# Patient Record
Sex: MALE | Race: WHITE | NOT HISPANIC OR LATINO | URBAN - METROPOLITAN AREA
[De-identification: names, ages, dates, MRNs, and addresses within clinical notes are randomized per-mention and may not be internally consistent; named-entity substitution may affect disease eponyms.]

---

## 2018-07-12 ENCOUNTER — OFFICE (OUTPATIENT)
Dept: URBAN - METROPOLITAN AREA CLINIC 67 | Facility: CLINIC | Age: 43
End: 2018-07-12

## 2018-07-12 VITALS — SYSTOLIC BLOOD PRESSURE: 110 MMHG | DIASTOLIC BLOOD PRESSURE: 72 MMHG | WEIGHT: 181 LBS | HEIGHT: 73 IN

## 2018-07-12 DIAGNOSIS — K62.5 RECTAL BLEEDING: ICD-10-CM

## 2018-07-12 DIAGNOSIS — K64.9 HEMORRHOIDS: ICD-10-CM

## 2018-07-12 PROCEDURE — 99244 OFF/OP CNSLTJ NEW/EST MOD 40: CPT

## 2018-07-12 RX ORDER — HYDROCORTISONE ACETATE 25 MG/1
50 SUPPOSITORY RECTAL
Qty: 28 | Status: ACTIVE
Start: 2018-07-12

## 2018-07-12 RX ORDER — HYDROCORTISONE 25 MG/G
CREAM TOPICAL
Qty: 1 | Status: ACTIVE
Start: 2018-07-12

## 2018-07-12 NOTE — SERVICEHPINOTES
This is a 44 yo m with hx of hemorrhoids presents with intermittent rectal bleeding. He reports to have thought it was from hemorrhoids and has tried suppositories with some relief but still with intermittent blood in the stool. He denies any abdominal pain, rectal pain, or changes of bowel habits. He denies any previous colonoscopy. He denies any family hx of colon ca.

## 2018-07-15 ENCOUNTER — HOSPITAL ENCOUNTER (EMERGENCY)
Dept: HOSPITAL 54 - ER | Age: 43
Discharge: HOME | End: 2018-07-15
Payer: COMMERCIAL

## 2018-07-15 VITALS — HEIGHT: 73 IN | BODY MASS INDEX: 23.19 KG/M2 | WEIGHT: 175 LBS

## 2018-07-15 VITALS — DIASTOLIC BLOOD PRESSURE: 66 MMHG | SYSTOLIC BLOOD PRESSURE: 152 MMHG
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
## 2018-07-15 DIAGNOSIS — S63.502A: Primary | ICD-10-CM
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
## 2018-07-15 PROCEDURE — Z7610: HCPCS

## 2018-07-15 PROCEDURE — A4606 OXYGEN PROBE USED W OXIMETER: HCPCS
